# Patient Record
Sex: FEMALE | Race: OTHER | HISPANIC OR LATINO
[De-identification: names, ages, dates, MRNs, and addresses within clinical notes are randomized per-mention and may not be internally consistent; named-entity substitution may affect disease eponyms.]

---

## 2022-07-28 PROBLEM — Z00.00 ENCOUNTER FOR PREVENTIVE HEALTH EXAMINATION: Status: ACTIVE | Noted: 2022-07-28

## 2022-08-05 ENCOUNTER — LABORATORY RESULT (OUTPATIENT)
Age: 52
End: 2022-08-05

## 2022-08-10 ENCOUNTER — INPATIENT (INPATIENT)
Facility: HOSPITAL | Age: 52
LOS: 0 days | Discharge: HOME | DRG: 920 | End: 2022-08-11
Attending: SURGERY | Admitting: SURGERY
Payer: COMMERCIAL

## 2022-08-10 ENCOUNTER — APPOINTMENT (OUTPATIENT)
Age: 52
End: 2022-08-10

## 2022-08-10 ENCOUNTER — RESULT REVIEW (OUTPATIENT)
Age: 52
End: 2022-08-10

## 2022-08-10 ENCOUNTER — TRANSCRIPTION ENCOUNTER (OUTPATIENT)
Age: 52
End: 2022-08-10

## 2022-08-10 VITALS
DIASTOLIC BLOOD PRESSURE: 90 MMHG | RESPIRATION RATE: 18 BRPM | SYSTOLIC BLOOD PRESSURE: 143 MMHG | HEART RATE: 65 BPM | TEMPERATURE: 98 F | OXYGEN SATURATION: 96 %

## 2022-08-10 DIAGNOSIS — Z98.890 OTHER SPECIFIED POSTPROCEDURAL STATES: Chronic | ICD-10-CM

## 2022-08-10 DIAGNOSIS — Z98.891 HISTORY OF UTERINE SCAR FROM PREVIOUS SURGERY: Chronic | ICD-10-CM

## 2022-08-10 DIAGNOSIS — K86.2 CYST OF PANCREAS: Chronic | ICD-10-CM

## 2022-08-10 LAB
ANION GAP SERPL CALC-SCNC: 14 MMOL/L — SIGNIFICANT CHANGE UP (ref 5–17)
APTT BLD: 31.1 SEC — SIGNIFICANT CHANGE UP (ref 27.5–35.5)
BASOPHILS # BLD AUTO: 0.04 K/UL — SIGNIFICANT CHANGE UP (ref 0–0.2)
BASOPHILS NFR BLD AUTO: 0.5 % — SIGNIFICANT CHANGE UP (ref 0–2)
BLD GP AB SCN SERPL QL: NEGATIVE — SIGNIFICANT CHANGE UP
BUN SERPL-MCNC: 10 MG/DL — SIGNIFICANT CHANGE UP (ref 7–23)
CALCIUM SERPL-MCNC: 9.4 MG/DL — SIGNIFICANT CHANGE UP (ref 8.4–10.5)
CHLORIDE SERPL-SCNC: 99 MMOL/L — SIGNIFICANT CHANGE UP (ref 96–108)
CO2 SERPL-SCNC: 26 MMOL/L — SIGNIFICANT CHANGE UP (ref 22–31)
CREAT SERPL-MCNC: 0.66 MG/DL — SIGNIFICANT CHANGE UP (ref 0.5–1.3)
EGFR: 107 ML/MIN/1.73M2 — SIGNIFICANT CHANGE UP
EOSINOPHIL # BLD AUTO: 0.09 K/UL — SIGNIFICANT CHANGE UP (ref 0–0.5)
EOSINOPHIL NFR BLD AUTO: 1.2 % — SIGNIFICANT CHANGE UP (ref 0–6)
GLUCOSE SERPL-MCNC: 100 MG/DL — HIGH (ref 70–99)
HCT VFR BLD CALC: 43.5 % — SIGNIFICANT CHANGE UP (ref 34.5–45)
HGB BLD-MCNC: 14.3 G/DL — SIGNIFICANT CHANGE UP (ref 11.5–15.5)
IMM GRANULOCYTES NFR BLD AUTO: 0.4 % — SIGNIFICANT CHANGE UP (ref 0–1.5)
INR BLD: 1.1 — SIGNIFICANT CHANGE UP (ref 0.88–1.16)
LACTATE SERPL-SCNC: 2.1 MMOL/L — HIGH (ref 0.5–2)
LYMPHOCYTES # BLD AUTO: 1.9 K/UL — SIGNIFICANT CHANGE UP (ref 1–3.3)
LYMPHOCYTES # BLD AUTO: 25.2 % — SIGNIFICANT CHANGE UP (ref 13–44)
MAGNESIUM SERPL-MCNC: 2.2 MG/DL — SIGNIFICANT CHANGE UP (ref 1.6–2.6)
MCHC RBC-ENTMCNC: 30.8 PG — SIGNIFICANT CHANGE UP (ref 27–34)
MCHC RBC-ENTMCNC: 32.9 GM/DL — SIGNIFICANT CHANGE UP (ref 32–36)
MCV RBC AUTO: 93.5 FL — SIGNIFICANT CHANGE UP (ref 80–100)
MONOCYTES # BLD AUTO: 0.48 K/UL — SIGNIFICANT CHANGE UP (ref 0–0.9)
MONOCYTES NFR BLD AUTO: 6.4 % — SIGNIFICANT CHANGE UP (ref 2–14)
NEUTROPHILS # BLD AUTO: 5.01 K/UL — SIGNIFICANT CHANGE UP (ref 1.8–7.4)
NEUTROPHILS NFR BLD AUTO: 66.3 % — SIGNIFICANT CHANGE UP (ref 43–77)
NRBC # BLD: 0 /100 WBCS — SIGNIFICANT CHANGE UP (ref 0–0)
PHOSPHATE SERPL-MCNC: 3.3 MG/DL — SIGNIFICANT CHANGE UP (ref 2.5–4.5)
PLATELET # BLD AUTO: 215 K/UL — SIGNIFICANT CHANGE UP (ref 150–400)
POTASSIUM SERPL-MCNC: 4 MMOL/L — SIGNIFICANT CHANGE UP (ref 3.5–5.3)
POTASSIUM SERPL-SCNC: 4 MMOL/L — SIGNIFICANT CHANGE UP (ref 3.5–5.3)
PROTHROM AB SERPL-ACNC: 13.1 SEC — SIGNIFICANT CHANGE UP (ref 10.5–13.4)
RBC # BLD: 4.65 M/UL — SIGNIFICANT CHANGE UP (ref 3.8–5.2)
RBC # FLD: 13.4 % — SIGNIFICANT CHANGE UP (ref 10.3–14.5)
RH IG SCN BLD-IMP: POSITIVE — SIGNIFICANT CHANGE UP
SODIUM SERPL-SCNC: 139 MMOL/L — SIGNIFICANT CHANGE UP (ref 135–145)
WBC # BLD: 7.55 K/UL — SIGNIFICANT CHANGE UP (ref 3.8–10.5)
WBC # FLD AUTO: 7.55 K/UL — SIGNIFICANT CHANGE UP (ref 3.8–10.5)

## 2022-08-10 PROCEDURE — 88305 TISSUE EXAM BY PATHOLOGIST: CPT | Mod: 26

## 2022-08-10 PROCEDURE — 45349 SIGMOIDOSCOPY W/RESECTION: CPT

## 2022-08-10 PROCEDURE — 43259 EGD US EXAM DUODENUM/JEJUNUM: CPT

## 2022-08-10 PROCEDURE — 74019 RADEX ABDOMEN 2 VIEWS: CPT | Mod: 26

## 2022-08-10 PROCEDURE — 88342 IMHCHEM/IMCYTCHM 1ST ANTB: CPT | Mod: 26

## 2022-08-10 PROCEDURE — 88341 IMHCHEM/IMCYTCHM EA ADD ANTB: CPT | Mod: 26

## 2022-08-10 DEVICE — IMPLANTABLE DEVICE: Type: IMPLANTABLE DEVICE | Status: FUNCTIONAL

## 2022-08-10 DEVICE — DUETTE MULTI-BAND: Type: IMPLANTABLE DEVICE | Status: FUNCTIONAL

## 2022-08-10 RX ORDER — CEFTRIAXONE 500 MG/1
1000 INJECTION, POWDER, FOR SOLUTION INTRAMUSCULAR; INTRAVENOUS ONCE
Refills: 0 | Status: DISCONTINUED | OUTPATIENT
Start: 2022-08-10 | End: 2022-08-10

## 2022-08-10 RX ORDER — METRONIDAZOLE 500 MG
500 TABLET ORAL EVERY 8 HOURS
Refills: 0 | Status: DISCONTINUED | OUTPATIENT
Start: 2022-08-10 | End: 2022-08-11

## 2022-08-10 RX ORDER — ACETAMINOPHEN 500 MG
1000 TABLET ORAL ONCE
Refills: 0 | Status: COMPLETED | OUTPATIENT
Start: 2022-08-10 | End: 2022-08-10

## 2022-08-10 RX ORDER — HEPARIN SODIUM 5000 [USP'U]/ML
5000 INJECTION INTRAVENOUS; SUBCUTANEOUS EVERY 8 HOURS
Refills: 0 | Status: DISCONTINUED | OUTPATIENT
Start: 2022-08-10 | End: 2022-08-11

## 2022-08-10 RX ORDER — SODIUM CHLORIDE 9 MG/ML
1000 INJECTION, SOLUTION INTRAVENOUS
Refills: 0 | Status: DISCONTINUED | OUTPATIENT
Start: 2022-08-10 | End: 2022-08-11

## 2022-08-10 RX ORDER — METRONIDAZOLE 500 MG
500 TABLET ORAL ONCE
Refills: 0 | Status: DISCONTINUED | OUTPATIENT
Start: 2022-08-10 | End: 2022-08-10

## 2022-08-10 RX ORDER — CEFTRIAXONE 500 MG/1
1000 INJECTION, POWDER, FOR SOLUTION INTRAMUSCULAR; INTRAVENOUS EVERY 24 HOURS
Refills: 0 | Status: DISCONTINUED | OUTPATIENT
Start: 2022-08-10 | End: 2022-08-11

## 2022-08-10 RX ORDER — VENLAFAXINE HCL 75 MG
0 CAPSULE, EXT RELEASE 24 HR ORAL
Qty: 0 | Refills: 5 | DISCHARGE

## 2022-08-10 RX ADMIN — Medication 100 MILLIGRAM(S): at 20:13

## 2022-08-10 RX ADMIN — HEPARIN SODIUM 5000 UNIT(S): 5000 INJECTION INTRAVENOUS; SUBCUTANEOUS at 18:52

## 2022-08-10 RX ADMIN — CEFTRIAXONE 100 MILLIGRAM(S): 500 INJECTION, POWDER, FOR SOLUTION INTRAMUSCULAR; INTRAVENOUS at 18:52

## 2022-08-10 RX ADMIN — SODIUM CHLORIDE 110 MILLILITER(S): 9 INJECTION, SOLUTION INTRAVENOUS at 18:52

## 2022-08-10 NOTE — H&P ADULT - ATTENDING COMMENTS
Patient seen and examined with Dr. Gallagher, discussed procedural details with Dr. Guevara.  Having some tenesmus.  Abd soft, ND, NT.  Healed abdominoplasty incision.  Rectal exam deferred given recent repair.    A/P: Likely extraperitoneal rectal perforation during EMR, s/p bearclaw closure.  1. Observe on antibiotics  2. AXR, CXR.  3. NPO, IVF for now.  4. Serial abdominal exams.  5. If worsens clinically, will need CT or operation.

## 2022-08-10 NOTE — H&P ADULT - HISTORY OF PRESENT ILLNESS
52yo female with PMH of rectal NET and PSH of abdominoplasty and C/s x4 presents following flexible sigmoidoscopy. Patient presented for outpatient EMR of G2 NET 6cm from AV. During procedure perrectal fat encountered concerning for full thickness perforation with application of bear clip closure. Also performed was EGD and EUS for incidentally found 1.5cm pancreatic cyst, without FNA performed. In PACU patient complains of lower abdominal tenderness, rectal discomfort. Denies chest pain, dyspnea, nausea, vomiting, diarrhea, recent weight loss. Preprocedure workup course, patient complained of "upset stomach" and was referred for ouptatient colonoscopy in 6/22 with rectal polyp identified and removed via cold snare. Pathology revealed G2 NET without LVI. Additional staging included CT chest and CTAP revealing only 1.5cm pancreatic cyst.     PMH: NET  PSH: abdominoplasty ([place]), C/s x4, hand cyst excision  ALL: NKDA  MED: none  FH: no history of crc/ibd  SH: every day mj, occasional etoh, no other drug used    - afebrile, non tachycardic, normotensive  - Labs demonstrating normal WBC, Lactate 2.1  - no imaging to review       50yo female with PMH of rectal NET and PSH of abdominoplasty and C/s x4 presents following flexible sigmoidoscopy. Patient presented for outpatient EMR of G2 NET 6cm from AV. During procedure perrectal fat encountered concerning for full thickness perforation with application of bear clip closure. Also performed was EGD and EUS for incidentally found 1.5cm pancreatic cyst, without FNA performed. In PACU patient complains of lower abdominal tenderness, rectal discomfort. Denies chest pain, dyspnea, nausea, vomiting, diarrhea, recent weight loss. Preprocedure workup course, patient complained of "upset stomach" and was referred for ouptatient colonoscopy in 6/22 with rectal polyp identified and removed via cold snare. Pathology revealed G2 NET without LVI. Additional staging included CT chest and CTAP revealing only 1.5cm pancreatic cyst.     PMH: NET  PSH: abdominoplasty ([place]), C/s x4, hand cyst excision  ALL: NKDA  MED: none  FH: no history of crc/ibd  SH: every day mj, occasional etoh, no other drug used    - afebrile, non tachycardic, normotensive  - Labs demonstrating normal WBC, Lactate 2.1  - no imaging to review

## 2022-08-10 NOTE — H&P ADULT - NSHPADDITIONALINFOADULT_GEN_ALL_CORE
CHIEF RESIDENT ADDENDUM  Agree with above. 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 Currently, complaining of mild perianal pain but otherwise no abdominal or pelvic pain. VSS, abdomen soft, nontender, nondistended. AXRs pending. DDx includes extra vs intraperitoneal perforation, but less likely intraperitoneal given benign appearance. Serial exams, antibiotics, and low threshold for operative intervention if she deteriorates overnight. Discussed with attending surgeon.

## 2022-08-10 NOTE — H&P ADULT - ASSESSMENT
52yo female with diagnosed NET s/p outpatient EMR with concern for extraperitoneal rectal perforation. Afebrile, HDS, exam non focal, no peritonitis, soft. Labs wnl. Will admit for further observation.     - Admit to regionalDr. Agudelo  - NPO/IVF  - SQH/SCD  - Ceftriaxone/ MTD  - 2 view abdominal film  - Serial abdominal exams   - AM labs

## 2022-08-10 NOTE — H&P ADULT - NSHPLABSRESULTS_GEN_ALL_CORE
14.3   7.55  )-----------( 215      ( 10 Aug 2022 17:24 )             43.5   08-10    139  |  99  |  10  ----------------------------<  100<H>  4.0   |  26  |  0.66    Ca    9.4      10 Aug 2022 17:24  Phos  3.3     08-10  Mg     2.2     08-10

## 2022-08-10 NOTE — H&P ADULT - NSHPPHYSICALEXAM_GEN_ALL_CORE
Physical Exam:  General: NAD, resting comfortably in bed  Pulmonary: Nonlabored, no respiratory distress  Cardiovascular: regular rate and rhythm   Abdominal: soft, NT/ND  Extremities: WWP, normal strength  Neuro: A/O x 3, no focal deficits, normal motor/sensation  Pulses: palpable distal pulses

## 2022-08-11 ENCOUNTER — TRANSCRIPTION ENCOUNTER (OUTPATIENT)
Age: 52
End: 2022-08-11

## 2022-08-11 VITALS
HEART RATE: 98 BPM | TEMPERATURE: 99 F | OXYGEN SATURATION: 99 % | RESPIRATION RATE: 16 BRPM | SYSTOLIC BLOOD PRESSURE: 125 MMHG | DIASTOLIC BLOOD PRESSURE: 83 MMHG

## 2022-08-11 LAB
ANION GAP SERPL CALC-SCNC: 12 MMOL/L — SIGNIFICANT CHANGE UP (ref 5–17)
BUN SERPL-MCNC: 9 MG/DL — SIGNIFICANT CHANGE UP (ref 7–23)
CALCIUM SERPL-MCNC: 9.2 MG/DL — SIGNIFICANT CHANGE UP (ref 8.4–10.5)
CHLORIDE SERPL-SCNC: 100 MMOL/L — SIGNIFICANT CHANGE UP (ref 96–108)
CO2 SERPL-SCNC: 24 MMOL/L — SIGNIFICANT CHANGE UP (ref 22–31)
CREAT SERPL-MCNC: 0.61 MG/DL — SIGNIFICANT CHANGE UP (ref 0.5–1.3)
EGFR: 110 ML/MIN/1.73M2 — SIGNIFICANT CHANGE UP
GLUCOSE SERPL-MCNC: 93 MG/DL — SIGNIFICANT CHANGE UP (ref 70–99)
HCG UR QL: NEGATIVE — SIGNIFICANT CHANGE UP
HCT VFR BLD CALC: 38.7 % — SIGNIFICANT CHANGE UP (ref 34.5–45)
HGB BLD-MCNC: 12.8 G/DL — SIGNIFICANT CHANGE UP (ref 11.5–15.5)
MAGNESIUM SERPL-MCNC: 2.4 MG/DL — SIGNIFICANT CHANGE UP (ref 1.6–2.6)
MCHC RBC-ENTMCNC: 30.6 PG — SIGNIFICANT CHANGE UP (ref 27–34)
MCHC RBC-ENTMCNC: 33.1 GM/DL — SIGNIFICANT CHANGE UP (ref 32–36)
MCV RBC AUTO: 92.6 FL — SIGNIFICANT CHANGE UP (ref 80–100)
NRBC # BLD: 0 /100 WBCS — SIGNIFICANT CHANGE UP (ref 0–0)
PHOSPHATE SERPL-MCNC: 3.4 MG/DL — SIGNIFICANT CHANGE UP (ref 2.5–4.5)
PLATELET # BLD AUTO: 205 K/UL — SIGNIFICANT CHANGE UP (ref 150–400)
POTASSIUM SERPL-MCNC: 3.7 MMOL/L — SIGNIFICANT CHANGE UP (ref 3.5–5.3)
POTASSIUM SERPL-SCNC: 3.7 MMOL/L — SIGNIFICANT CHANGE UP (ref 3.5–5.3)
RBC # BLD: 4.18 M/UL — SIGNIFICANT CHANGE UP (ref 3.8–5.2)
RBC # FLD: 13.3 % — SIGNIFICANT CHANGE UP (ref 10.3–14.5)
SARS-COV-2 RNA SPEC QL NAA+PROBE: NEGATIVE — SIGNIFICANT CHANGE UP
SODIUM SERPL-SCNC: 136 MMOL/L — SIGNIFICANT CHANGE UP (ref 135–145)
WBC # BLD: 12.32 K/UL — HIGH (ref 3.8–10.5)
WBC # FLD AUTO: 12.32 K/UL — HIGH (ref 3.8–10.5)

## 2022-08-11 PROCEDURE — 88341 IMHCHEM/IMCYTCHM EA ADD ANTB: CPT

## 2022-08-11 PROCEDURE — 88342 IMHCHEM/IMCYTCHM 1ST ANTB: CPT

## 2022-08-11 PROCEDURE — 85730 THROMBOPLASTIN TIME PARTIAL: CPT

## 2022-08-11 PROCEDURE — 84100 ASSAY OF PHOSPHORUS: CPT

## 2022-08-11 PROCEDURE — 85025 COMPLETE CBC W/AUTO DIFF WBC: CPT

## 2022-08-11 PROCEDURE — 85610 PROTHROMBIN TIME: CPT

## 2022-08-11 PROCEDURE — 81025 URINE PREGNANCY TEST: CPT

## 2022-08-11 PROCEDURE — 36415 COLL VENOUS BLD VENIPUNCTURE: CPT

## 2022-08-11 PROCEDURE — 85027 COMPLETE CBC AUTOMATED: CPT

## 2022-08-11 PROCEDURE — 74177 CT ABD & PELVIS W/CONTRAST: CPT

## 2022-08-11 PROCEDURE — 86850 RBC ANTIBODY SCREEN: CPT

## 2022-08-11 PROCEDURE — 88305 TISSUE EXAM BY PATHOLOGIST: CPT

## 2022-08-11 PROCEDURE — 74019 RADEX ABDOMEN 2 VIEWS: CPT

## 2022-08-11 PROCEDURE — 87635 SARS-COV-2 COVID-19 AMP PRB: CPT

## 2022-08-11 PROCEDURE — 83735 ASSAY OF MAGNESIUM: CPT

## 2022-08-11 PROCEDURE — 86901 BLOOD TYPING SEROLOGIC RH(D): CPT

## 2022-08-11 PROCEDURE — 83605 ASSAY OF LACTIC ACID: CPT

## 2022-08-11 PROCEDURE — 99221 1ST HOSP IP/OBS SF/LOW 40: CPT

## 2022-08-11 PROCEDURE — 74177 CT ABD & PELVIS W/CONTRAST: CPT | Mod: 26

## 2022-08-11 PROCEDURE — 86900 BLOOD TYPING SEROLOGIC ABO: CPT

## 2022-08-11 PROCEDURE — 80048 BASIC METABOLIC PNL TOTAL CA: CPT

## 2022-08-11 PROCEDURE — C1889: CPT

## 2022-08-11 RX ORDER — ONDANSETRON 8 MG/1
4 TABLET, FILM COATED ORAL ONCE
Refills: 0 | Status: COMPLETED | OUTPATIENT
Start: 2022-08-11 | End: 2022-08-11

## 2022-08-11 RX ORDER — CEFPODOXIME PROXETIL 100 MG
1 TABLET ORAL
Qty: 12 | Refills: 0
Start: 2022-08-11 | End: 2022-08-16

## 2022-08-11 RX ORDER — POTASSIUM CHLORIDE 20 MEQ
10 PACKET (EA) ORAL
Refills: 0 | Status: COMPLETED | OUTPATIENT
Start: 2022-08-11 | End: 2022-08-11

## 2022-08-11 RX ORDER — ACETAMINOPHEN 500 MG
1000 TABLET ORAL ONCE
Refills: 0 | Status: COMPLETED | OUTPATIENT
Start: 2022-08-11 | End: 2022-08-11

## 2022-08-11 RX ORDER — HYDROMORPHONE HYDROCHLORIDE 2 MG/ML
0.5 INJECTION INTRAMUSCULAR; INTRAVENOUS; SUBCUTANEOUS ONCE
Refills: 0 | Status: DISCONTINUED | OUTPATIENT
Start: 2022-08-11 | End: 2022-08-11

## 2022-08-11 RX ORDER — METRONIDAZOLE 500 MG
1 TABLET ORAL
Qty: 18 | Refills: 0
Start: 2022-08-11 | End: 2022-08-16

## 2022-08-11 RX ADMIN — HYDROMORPHONE HYDROCHLORIDE 0.5 MILLIGRAM(S): 2 INJECTION INTRAMUSCULAR; INTRAVENOUS; SUBCUTANEOUS at 08:30

## 2022-08-11 RX ADMIN — HEPARIN SODIUM 5000 UNIT(S): 5000 INJECTION INTRAVENOUS; SUBCUTANEOUS at 17:03

## 2022-08-11 RX ADMIN — HYDROMORPHONE HYDROCHLORIDE 0.5 MILLIGRAM(S): 2 INJECTION INTRAMUSCULAR; INTRAVENOUS; SUBCUTANEOUS at 07:59

## 2022-08-11 RX ADMIN — Medication 400 MILLIGRAM(S): at 00:13

## 2022-08-11 RX ADMIN — ONDANSETRON 4 MILLIGRAM(S): 8 TABLET, FILM COATED ORAL at 11:40

## 2022-08-11 RX ADMIN — Medication 400 MILLIGRAM(S): at 12:14

## 2022-08-11 RX ADMIN — Medication 100 MILLIEQUIVALENT(S): at 08:58

## 2022-08-11 RX ADMIN — Medication 1000 MILLIGRAM(S): at 12:25

## 2022-08-11 RX ADMIN — Medication 100 MILLIEQUIVALENT(S): at 11:58

## 2022-08-11 RX ADMIN — Medication 100 MILLIGRAM(S): at 14:30

## 2022-08-11 RX ADMIN — Medication 100 MILLIGRAM(S): at 05:37

## 2022-08-11 RX ADMIN — HEPARIN SODIUM 5000 UNIT(S): 5000 INJECTION INTRAVENOUS; SUBCUTANEOUS at 00:11

## 2022-08-11 RX ADMIN — Medication 1000 MILLIGRAM(S): at 01:10

## 2022-08-11 NOTE — DISCHARGE NOTE PROVIDER - CARE PROVIDERS DIRECT ADDRESSES
,DirectAddress_Unknown ,DirectAddress_Unknown,[email or web address]

## 2022-08-11 NOTE — DISCHARGE NOTE NURSING/CASE MANAGEMENT/SOCIAL WORK - PATIENT PORTAL LINK FT
You can access the FollowMyHealth Patient Portal offered by [facility] by registering at the following website: [email or web address]. By joining [facility]’s FollowMyHealth portal, you will also be able to view your health information using other applications (apps) compatible with our system.

## 2022-08-11 NOTE — DISCHARGE NOTE PROVIDER - NSDCFUADDAPPT_GEN_ALL_CORE_FT
Please follow up with Dr. Guevara as directed. The office will reach out to you however you may call the office at [phone] to schedule appt.

## 2022-08-11 NOTE — CHART NOTE - NSCHARTNOTEFT_GEN_A_CORE
SERIAL ABDOMINAL EXAM @ 12:45pm    Pt seen and examined at bedside prior to CTAP. Reports feeling nauseous and had 1 episode of yellow tinged spit up/sputum, no true emesis. Denies F, CP, SOB, or worsening abdominal pain. +F/-BM. Overall exam unchanged.     ICU Vital Signs Last 24 Hrs  T(C): 36.8 (11 Aug 2022 08:41), Max: 36.8 (11 Aug 2022 00:00)  T(F): 98.3 (11 Aug 2022 08:41), Max: 98.3 (11 Aug 2022 00:00)  HR: 83 (11 Aug 2022 08:41) (62 - 99)  BP: 147/93 (11 Aug 2022 08:41) (143/89 - 163/99)  BP(mean): 120 (11 Aug 2022 00:00) (120 - 120)  ABP: --  ABP(mean): --  RR: 17 (11 Aug 2022 08:41) (16 - 18)  SpO2: 98% (11 Aug 2022 08:41) (96% - 99%)    O2 Parameters below as of 11 Aug 2022 08:41  Patient On (Oxygen Delivery Method): room air      Physical Exam    Constitutional: A&Ox3, NAD  Respiratory: non labored breathing, no respiratory distress  Cardiovascular: NSR, RRR  Gastrointestinal: abdomen soft, nd, nt, no rebound or guarding.  Extremities: wwp, no calf tenderness or edema. SCDs in place     50yo female with diagnosed NET s/p outpatient EMR with concern for extraperitoneal rectal perforation. Afebrile, HDS, exam non focal, no peritonitis, soft. Labs wnl. Admitted for further observation. HD stable.    - CTAP rectal/IV contrast performed and reviewed by attending - overall unremarkable, awaiting final read  - Regular diet/IVF  - HSQ/SCDs  - Ceftriaxone/Flagyl   - Serial abdominal exams Q6H  - Likely d/c home tonight if remains HD stable w/ course of PO abx x 6d
Patient is doing well, c/o pain in her RLQ that comes and goes, rating it at a 5/10. Denies any nausea, vomiting, chest pain, shortness of breath, calf tenderness, fever or chills. Reports that she is feeling very hungry, no nausea or vomiting.     Constitutional: AAOx3, no acute distress  HEENT: NCAT, airway patent  Cardiovascular: RRR, pulses present bilaterally  Respiratory: nonlabored breathing  Gastrointestinal: abdomen soft, nontender, non distended, no rebound or guarding  Neuro: no focal deficits    Vital Signs Last 24 Hrs  T(C): 36.4 (10 Aug 2022 21:09), Max: 36.7 (10 Aug 2022 18:45)  T(F): 97.6 (10 Aug 2022 21:09), Max: 98.1 (10 Aug 2022 18:45)  HR: 74 (10 Aug 2022 21:09) (65 - 74)  BP: 143/89 (10 Aug 2022 21:09) (143/89 - 143/90)  RR: 16 (10 Aug 2022 21:09) (16 - 18)  SpO2: 98% (10 Aug 2022 21:09) (96% - 98%)    Parameters below as of 10 Aug 2022 21:09  Patient On (Oxygen Delivery Method): room air      A/P  follow up AXR final read  NPO/IVF  Cef/Flagyl  continue TU

## 2022-08-11 NOTE — PROGRESS NOTE ADULT - ASSESSMENT
52yo female with diagnosed NET s/p outpatient EMR with concern for extraperitoneal rectal perforation. Afebrile, HDS, exam non focal, no peritonitis, soft. Labs wnl. Will admit for further observation.     - Admit to regional, Dr. Agudelo  - NPO/IVF  - HSQ/SCDs  - Ceftriaxone/Flagyl   - Serial abdominal exams Q6H  - AM labs  - F/u CTAP w/ IV and rectal contrast

## 2022-08-11 NOTE — CONSULT NOTE ADULT - SUBJECTIVE AND OBJECTIVE BOX
GASTROENTEROLOGY CONSULT NOTE  HPI:  52 yo F, PMHx of Rectal NET s/p EMR 8/10/22, presenting following outpatient Flex Sig.  Patient presented for outpatient EMR of G2 NET 6cm from Anal Verge, Over The Scope Clip placed, and patient had tenesmus post procedurally.     This AM is tearful, as she does not like CT Scans ; has not had a BM yet, but is passing flatus , she feels discomfort in her rectum, with a deep cramping sensation ; denies fevers, chills, cp, sob    PMH: Rectal NET s/p EMR  PSH: abdominoplasty ([place]), C/s x4, hand cyst excision  ALL: NKDA  MED: denies  FH: HTN  SocHx: daily THC, social etoh, denies illicit drugs    Allergies    No Known Allergies    Intolerances      Home Medications:  VENLAFAXINE ER 37.5MG CAPSULES: TAKE 1 CAPSULE BY MOUTH EVERY DAY (10 Aug 2022 19:38)    MEDICATIONS:  MEDICATIONS  (STANDING):  cefTRIAXone   IVPB 1000 milliGRAM(s) IV Intermittent every 24 hours  heparin   Injectable 5000 Unit(s) SubCutaneous every 8 hours  lactated ringers. 1000 milliLiter(s) (110 mL/Hr) IV Continuous <Continuous>  metroNIDAZOLE  IVPB 500 milliGRAM(s) IV Intermittent every 8 hours  potassium chloride  10 mEq/100 mL IVPB 10 milliEquivalent(s) IV Intermittent every 1 hour    REVIEW OF SYSTEMS:  All other 10 review of systems is negative unless indicated above.    Vital Signs Last 24 Hrs  T(C): 36.8 (11 Aug 2022 05:16), Max: 36.8 (11 Aug 2022 00:00)  T(F): 98.3 (11 Aug 2022 05:16), Max: 98.3 (11 Aug 2022 00:00)  HR: 68 (11 Aug 2022 05:16) (62 - 99)  BP: 155/93 (11 Aug 2022 05:16) (143/89 - 163/99)  BP(mean): 120 (11 Aug 2022 00:00) (120 - 120)  RR: 16 (11 Aug 2022 05:16) (16 - 18)  SpO2: 98% (11 Aug 2022 05:16) (96% - 99%)    Parameters below as of 11 Aug 2022 05:16  Patient On (Oxygen Delivery Method): room air        08-10 @ 07:01  -  08-11 @ 07:00  --------------------------------------------------------  IN: 660 mL / OUT: 1000 mL / NET: -340 mL        PHYSICAL EXAM:    General: lying in bed, in tearful distress  HEENT: Neck supple, mmm, no jvd  Lungs: Normal respiratory effort, no intercostal retractions  Cardiovascular: regular rate  Abdomen: Soft, non-tender non-distended; No rebound or guarding  Extremities: wwp, no cce  Neurological: TINSLEY, speech fluent  Skin: Warm and dry. No obvious rash    LABS:                        12.8   12.32 )-----------( 205      ( 11 Aug 2022 06:22 )             38.7     08-11    136  |  100  |  9   ----------------------------<  93  3.7   |  24  |  0.61    Ca    9.2      11 Aug 2022 06:22  Phos  3.4     08-11  Mg     2.4     08-11          PT/INR - ( 10 Aug 2022 17:24 )   PT: 13.1 sec;   INR: 1.10          PTT - ( 10 Aug 2022 17:24 )  PTT:31.1 sec    RADIOLOGY & ADDITIONAL STUDIES:     Reviewed

## 2022-08-11 NOTE — CONSULT NOTE ADULT - ASSESSMENT
50 yo F, PMHx of Rectal NET s/p EMR 8/10/22, presenting following outpatient Flex Sig.  Patient presented for outpatient EMR of G2 NET 6cm from Anal Verge, Over The Scope Clip placed, and patient had tenesmus post procedurally.     #Rectal NET s/p EMR with OTSC placement  #Abd Pain  #concern for extraperitoneal perforation    Recommendations:  Agree with CT scan  CW Antibiotics  IVF  Will follow closely    Thank you for the courtesy of this consult. We will follow along with you.    Pierre Byers M.D.  Gastroenterology Fellow  Pager: [phone]

## 2022-08-11 NOTE — DISCHARGE NOTE NURSING/CASE MANAGEMENT/SOCIAL WORK - NSDCFUADDAPPT_GEN_ALL_CORE_FT
Please follow up with Dr. Guevara as directed. The office will reach out to you however you may call the office at [phone] to schedule appt.

## 2022-08-11 NOTE — DISCHARGE NOTE PROVIDER - HOSPITAL COURSE
50yo female with diagnosed NET s/p outpatient EMR with concern for extraperitoneal rectal perforation. Afebrile, HDS, exam non focal, no peritonitis, soft. Labs wnl. Admitted to surgery department on 8/10/22 for further evaluation. Pt with slightly worsening abdominal exam but overall nonperitonitic/nontoxic. CTAP w/ IV and rectal contrast performed showing stable mild rectal perforation, pancreatic head cystic lesion, possible branch duch IPMN. Pt continued to be monitored with routine labs/vitals and serial abdominal exams. Diet advanced as tolerated.       At time of discharge pt is tolerating diet, pain well controlled, pt is ambulating/voiding freely, having adequate bowel function. Pt is HD stable and medically ready for discharge.   52yo female with diagnosed NET s/p outpatient EMR with concern for extraperitoneal rectal perforation. Afebrile, HDS, exam non focal, no peritonitis, soft. Labs wnl. Admitted to surgery department on 8/10/22 for further evaluation. Pt with slightly worsening abdominal exam but overall nonperitonitic/nontoxic. CTAP w/ IV and rectal contrast performed showing stable mild rectal perforation, pancreatic head cystic lesion, possible branch duct IPMN. Pt continued to be monitored with routine labs/vitals and serial abdominal exams. Diet advanced as tolerated.       At time of discharge pt is tolerating diet, pain well controlled, pt is ambulating/voiding freely, having adequate bowel function. Pt is HD stable and medically ready for discharge.   50yo female with diagnosed NET s/p outpatient EMR with concern for extraperitoneal rectal perforation. Afebrile, HDS, exam non focal, no peritonitis, soft. Labs wnl. Admitted to surgery department on 8/10/22 for further evaluation. Pt with slightly worsening abdominal exam but overall nonperitonitic/nontoxic. CTAP w/ IV and rectal contrast performed showing stable mild rectal perforation, pancreatic head cystic lesion, possible branch duct IPMN. Pt continued to be monitored with routine labs/vitals and serial abdominal exams. Diet advanced as tolerated. At time of discharge pt also evaluated by GI team, is tolerating diet, pain well controlled, pt is ambulating/voiding freely, having adequate bowel function. Pt is HD stable and medically ready for discharge.

## 2022-08-11 NOTE — DISCHARGE NOTE PROVIDER - PROVIDER TOKENS
PROVIDER:[TOKEN:[[id number]]] PROVIDER:[TOKEN:[[id number]]],PROVIDER:[TOKEN:[[id number]]]

## 2022-08-11 NOTE — DISCHARGE NOTE NURSING/CASE MANAGEMENT/SOCIAL WORK - NSDCPEFALRISK_GEN_ALL_CORE
For information on Fall & Injury Prevention, visit: [email or web address] OR  [email or web address] OR  https://www.cdc.gov/steadi/patient.html

## 2022-08-11 NOTE — DISCHARGE NOTE PROVIDER - NSDCCPCAREPLAN_GEN_ALL_CORE_FT
PRINCIPAL DISCHARGE DIAGNOSIS  Diagnosis: Rectal perforation  Assessment and Plan of Treatment:

## 2022-08-11 NOTE — DISCHARGE NOTE PROVIDER - CARE PROVIDER_API CALL
Carla Agudelo)  ColonRectal Surgery; Surgery  [address] [place] [address], [address]  [place] [address]  Phone: [phone]  Fax: [phone]  Follow Up Time:    Carla Agudelo)  ColonRectal Surgery; Surgery  [address] [place] [address], [address]  [place] [address]  Phone: [phone]  Fax: [phone]  Follow Up Time:     Chuck Guevara)  Gastroenterology; Internal Medicine  [address] [place] [address]  Phone: [phone]  Fax: [phone]  Follow Up Time:

## 2022-08-11 NOTE — CONSULT NOTE ADULT - ATTENDING COMMENTS
51yoF, with hx of rectal NET s/p EMR 8/10/22 with OTSC placement. Was admitted for concern of possible post procedure perforation.   Agree with CT scan and continue with abx/IVF.   Will follow closely.   Appreciate surgery recs.

## 2022-08-11 NOTE — DISCHARGE NOTE PROVIDER - NSDCFUADDINST_GEN_ALL_CORE_FT
Follow up with  ___ in 1-2 weeks. Call the office at the number below to schedule your appointment. You may shower; soap and water over incision sites. Do not scrub. Pat dry when done. No tub bathing or swimming until cleared. Keep incision sites out of the sun as scars will darken. Ambulate as tolerated, but no heavy lifting (>10lbs) or strenuous exercise. You may resume regular diet. You should be urinating at least 3-4x per day. Call the office if you experience increasing abdominal pain, nausea, vomiting, or temperature >101 F.    Warning Signs:  Please call your doctor or nurse practitioner if you experience the following:  *You experience new chest pain, pressure, squeezing or tightness.  *New or worsening cough, shortness of breath, or wheeze.  *If you are vomiting and cannot keep down fluids or your medications.  *You are getting dehydrated due to continued vomiting, diarrhea, or other reasons. Signs of dehydration include dry mouth, rapid heartbeat, or feeling dizzy or faint when standing.  *You see blood or dark/black material when you vomit or have a bowel movement.  *You experience burning when you urinate, have blood in your urine, or experience a discharge.  *Your pain is not improving within 8-12 hours or is not gone within 24 hours. Call or return immediately if your pain is getting worse, changes location, or moves to your chest or back.  *You have shaking chills, or fever greater than 101.5 degrees Fahrenheit or 38 degrees Celsius.  *Any change in your symptoms, or any new symptoms that concern you.     New medications: Please take Cefpodoxime and Flagyl as directed x 6 days.  Follow up with Dr. Agudelo in 1 week from discharge. Call the office at [phone] to schedule your appointment. You should be urinating at least 3-4x per day. Call the office if you experience increasing abdominal pain, nausea, vomiting, or temperature >101 F.    Warning Signs:  Please call your doctor or nurse practitioner if you experience the following:  *You experience new chest pain, pressure, squeezing or tightness.  *New or worsening cough, shortness of breath, or wheeze.  *If you are vomiting and cannot keep down fluids or your medications.  *You are getting dehydrated due to continued vomiting, diarrhea, or other reasons. Signs of dehydration include dry mouth, rapid heartbeat, or feeling dizzy or faint when standing.  *You see blood or dark/black material when you vomit or have a bowel movement.  *You experience burning when you urinate, have blood in your urine, or experience a discharge.  *Your pain is not improving within 8-12 hours or is not gone within 24 hours. Call or return immediately if your pain is getting worse, changes location, or moves to your chest or back.  *You have shaking chills, or fever greater than 101.5 degrees Fahrenheit or 38 degrees Celsius.  *Any change in your symptoms, or any new symptoms that concern you.     New medications: Please take Cefpodoxime and Flagyl as directed x 6 days.

## 2022-08-11 NOTE — DISCHARGE NOTE PROVIDER - INSTRUCTIONS
Please continue a LOW-FIBER DIET. Listed below are some foods you may eat and those you should avoid.   --Allowed foods:  White bread without nuts and seeds  White rice, plain white pasta, and crackers  Refined hot cereals, such as Cream of Wheat, or cold cereals with less than 1 gram of fiber per serving  Pancakes or waffles made from white refined flour  Most canned or well-cooked vegetables and fruits without skins or seeds  Fruit and vegetable juice with little or no pulp, fruit-flavored drinks, and flavored cantrell  Tender meat, poultry, fish, eggs and tofu  Milk and foods made from milk — such as yogurt, pudding, ice cream, cheeses and sour cream — if tolerated  Butter, margarine, oils and salad dressings without seeds  --Foods to avoid:  Whole-wheat or whole-grain breads, cereals and pasta  Brown or wild rice and other whole grains, such as oats, kasha, barley and quinoa  Dried fruits and prune juice  Raw fruit, including those with seeds, skin or membranes, such as berries  Raw or undercooked vegetables, including corn;  Dried beans, peas and lentils; Seeds and nuts and foods containing them, including peanut butter and other nut butters; Coconut; popcorn

## 2022-08-11 NOTE — DISCHARGE NOTE PROVIDER - NSDCMRMEDTOKEN_GEN_ALL_CORE_FT
VENLAFAXINE ER 37.5MG CAPSULES: TAKE 1 CAPSULE BY MOUTH EVERY DAY   cefpodoxime 200 mg oral tablet: 1 tab(s) orally every 12 hours   metroNIDAZOLE 500 mg oral tablet: 1 tab(s) orally every 8 hours   VENLAFAXINE ER 37.5MG CAPSULES: TAKE 1 CAPSULE BY MOUTH EVERY DAY

## 2022-08-11 NOTE — PROGRESS NOTE ADULT - SUBJECTIVE AND OBJECTIVE BOX
Non-specific abdominal cramping.  Tenesmus mostly unchanged, perhaps a bit better.   AFVSS  Non-toxic  Abd soft, ND, no significant tenderness    WBC 12k    AXR yesterday without free air or SQ air    A/P: Low rectal full thickness excision of neuroendocrine tumor.  Persistent symptoms.  1. CT with rectal gastrografin, hand injection with noguera  2. On Ceftriaxone, flagyl  3. NPO for now, IVF.
INTERVAL HPI/OVERNIGHT EVENTS: admitted s/p perforation, TU documented, c/o abdominal pain given IV tylenol x1, lillian, vss    SUBJECTIVE: Pt seen and examined at bedside this am by surgery team. Reporting slightly worsening lower abdominal pain however appears nontoxic. +tenesmus. Denies f/n/v/cp/sob.    MEDICATIONS  (STANDING):  cefTRIAXone   IVPB 1000 milliGRAM(s) IV Intermittent every 24 hours  heparin   Injectable 5000 Unit(s) SubCutaneous every 8 hours  lactated ringers. 1000 milliLiter(s) (110 mL/Hr) IV Continuous <Continuous>  metroNIDAZOLE  IVPB 500 milliGRAM(s) IV Intermittent every 8 hours  potassium chloride  10 mEq/100 mL IVPB 10 milliEquivalent(s) IV Intermittent every 1 hour    MEDICATIONS  (PRN):    Vital Signs Last 24 Hrs  T(C): 36.8 (11 Aug 2022 08:41), Max: 36.8 (11 Aug 2022 00:00)  T(F): 98.3 (11 Aug 2022 08:41), Max: 98.3 (11 Aug 2022 00:00)  HR: 83 (11 Aug 2022 08:41) (62 - 99)  BP: 147/93 (11 Aug 2022 08:41) (143/89 - 163/99)  BP(mean): 120 (11 Aug 2022 00:00) (120 - 120)  RR: 17 (11 Aug 2022 08:41) (16 - 18)  SpO2: 98% (11 Aug 2022 08:41) (96% - 99%)    Parameters below as of 11 Aug 2022 08:41  Patient On (Oxygen Delivery Method): room air    PHYSICAL EXAM:    Constitutional: A&Ox3, NAD    Respiratory: non labored breathing, no respiratory distress    Cardiovascular: NSR, RRR    Gastrointestinal: abdomen soft, nd, mildly ttp to lower quadrants, no rebound or guarding.    Extremities: wwp, no calf tenderness or edema. SCDs in place     I&O's Detail    10 Aug 2022 07:01  -  11 Aug 2022 07:00  --------------------------------------------------------  IN:    Lactated Ringers: 660 mL  Total IN: 660 mL    OUT:    Oral Fluid: 0 mL    Voided (mL): 1000 mL  Total OUT: 1000 mL    Total NET: -340 mL          LABS:                        12.8   12.32 )-----------( 205      ( 11 Aug 2022 06:22 )             38.7     08-11    136  |  100  |  9   ----------------------------<  93  3.7   |  24  |  0.61    Ca    9.2      11 Aug 2022 06:22  Phos  3.4     08-11  Mg     2.4     08-11      PT/INR - ( 10 Aug 2022 17:24 )   PT: 13.1 sec;   INR: 1.10          PTT - ( 10 Aug 2022 17:24 )  PTT:31.1 sec      RADIOLOGY & ADDITIONAL STUDIES:

## 2022-08-12 LAB — SURGICAL PATHOLOGY STUDY: SIGNIFICANT CHANGE UP
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
## 2022-08-16 DIAGNOSIS — R19.8 OTHER SPECIFIED SYMPTOMS AND SIGNS INVOLVING THE DIGESTIVE SYSTEM AND ABDOMEN: ICD-10-CM

## 2022-08-16 DIAGNOSIS — Z20.822 CONTACT WITH AND (SUSPECTED) EXPOSURE TO COVID-19: ICD-10-CM

## 2022-08-16 DIAGNOSIS — D3A.098 BENIGN CARCINOID TUMORS OF OTHER SITES: ICD-10-CM

## 2022-08-16 DIAGNOSIS — K91.71 ACCIDENTAL PUNCTURE AND LACERATION OF A DIGESTIVE SYSTEM ORGAN OR STRUCTURE DURING A DIGESTIVE SYSTEM PROCEDURE: ICD-10-CM

## 2022-08-16 DIAGNOSIS — K86.2 CYST OF PANCREAS: ICD-10-CM

## 2025-09-02 ENCOUNTER — EMERGENCY (EMERGENCY)
Facility: HOSPITAL | Age: 55
LOS: 1 days | End: 2025-09-02
Attending: EMERGENCY MEDICINE | Admitting: EMERGENCY MEDICINE
Payer: COMMERCIAL

## 2025-09-02 VITALS
WEIGHT: 199.96 LBS | RESPIRATION RATE: 16 BRPM | TEMPERATURE: 98 F | OXYGEN SATURATION: 99 % | DIASTOLIC BLOOD PRESSURE: 90 MMHG | SYSTOLIC BLOOD PRESSURE: 133 MMHG | HEART RATE: 100 BPM

## 2025-09-02 VITALS
HEART RATE: 92 BPM | SYSTOLIC BLOOD PRESSURE: 128 MMHG | OXYGEN SATURATION: 98 % | RESPIRATION RATE: 18 BRPM | TEMPERATURE: 98 F | DIASTOLIC BLOOD PRESSURE: 87 MMHG

## 2025-09-02 DIAGNOSIS — K86.2 CYST OF PANCREAS: Chronic | ICD-10-CM

## 2025-09-02 DIAGNOSIS — Z98.890 OTHER SPECIFIED POSTPROCEDURAL STATES: Chronic | ICD-10-CM

## 2025-09-02 DIAGNOSIS — Z98.891 HISTORY OF UTERINE SCAR FROM PREVIOUS SURGERY: Chronic | ICD-10-CM

## 2025-09-02 PROCEDURE — 73030 X-RAY EXAM OF SHOULDER: CPT | Mod: 26,LT

## 2025-09-02 PROCEDURE — 99284 EMERGENCY DEPT VISIT MOD MDM: CPT

## 2025-09-02 PROCEDURE — 73562 X-RAY EXAM OF KNEE 3: CPT | Mod: 26,LT

## 2025-09-02 RX ORDER — ACETAMINOPHEN 500 MG/5ML
650 LIQUID (ML) ORAL ONCE
Refills: 0 | Status: COMPLETED | OUTPATIENT
Start: 2025-09-02 | End: 2025-09-02

## 2025-09-02 RX ORDER — IBUPROFEN 200 MG
600 TABLET ORAL ONCE
Refills: 0 | Status: COMPLETED | OUTPATIENT
Start: 2025-09-02 | End: 2025-09-02

## 2025-09-02 RX ADMIN — Medication 650 MILLIGRAM(S): at 07:59

## 2025-09-02 RX ADMIN — Medication 600 MILLIGRAM(S): at 07:59

## 2025-09-04 DIAGNOSIS — M25.512 PAIN IN LEFT SHOULDER: ICD-10-CM

## 2025-09-04 DIAGNOSIS — Z23 ENCOUNTER FOR IMMUNIZATION: ICD-10-CM
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
## 2025-09-04 DIAGNOSIS — M25.562 PAIN IN LEFT KNEE: ICD-10-CM
